# Patient Record
(demographics unavailable — no encounter records)

---

## 2025-06-03 NOTE — HISTORY OF PRESENT ILLNESS
[FreeTextEntry1] : RPA - Wart  [de-identified] : Aramis Bunch 52 y/o M presents for consult for wart on L upper ext.   2 sore thick plaques on L dorsal hand and L anterior elbow for 3 years.  Started out as 2 small bumps.  Thinks it started after getting COVID pfizer vaccine, broke out on L arm same day.  Saw derm 6 months after (3 years ago). Reportedly had bx done and was + for wart.  was given imiquimod, used for 2 weeks, had bad reaction to it , reports it 'blew up arm like ground beef'. was told to stop, no further follow up.  -has been self treating for wart at home. has been applying apple cider vinegar daily x 1 year to skin. skin has been inflamed and sore to touch.  thinks vinegar helping improve pain and swelling.  looks better today than it did before.   No know hx of immune systemic deficiency or issues. No hx HIV. does not have PCP, doesn't typically see doctors.  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 no further details     Personal history of skin cancer: no Family history of skin cancer: no  History of blistering sunburns: no History of tanning bed use: no Uses sunscreen regularly: no

## 2025-06-03 NOTE — ASSESSMENT
[FreeTextEntry1] : #Chronic lichenified plaques  #History of verruca vulgaris  Reports + biopsy 3 years ago from E confirming wart. Suspect that primary process may have started out as verruca 3 years ago, however majority of skin changes today atypical for wart. may be 2/2 chronic contact dermatitis and LSC from inappropriate application of wart treatments such as imiquimod and apple cider vinegar.   ill defined verruocus changes within plaque may represent self inoculation vs un-even treatment of wart lower suspicion for malignancy at this time due to multiple areas of involvement.  Other ddx includes other atypical cutaneous infection, wart   plan: -STOP apple cider vinegar completely  -Only apply gentle emollient like vaseline or aquaphor frequently throughout the day -start clobetasol .05% oint bid to actively inflamed/symptomatic areas -follow up in 8 weeks to monitor response. if insufficient improvement, plan punch bx for H&E and tissue culture.  pt instructed to follow up sooner

## 2025-06-03 NOTE — HISTORY OF PRESENT ILLNESS
[FreeTextEntry1] : RPA - Wart  [de-identified] : Aramis Bunch 54 y/o M presents for consult for wart on L upper ext.   2 sore thick plaques on L dorsal hand and L anterior elbow for 3 years.  Started out as 2 small bumps.  Thinks it started after getting COVID pfizer vaccine, broke out on L arm same day.  Saw derm 6 months after (3 years ago). Reportedly had bx done and was + for wart.  was given imiquimod, used for 2 weeks, had bad reaction to it , reports it 'blew up arm like ground beef'. was told to stop, no further follow up.  -has been self treating for wart at home. has been applying apple cider vinegar daily x 1 year to skin. skin has been inflamed and sore to touch.  thinks vinegar helping improve pain and swelling.  looks better today than it did before.   No know hx of immune systemic deficiency or issues. No hx HIV. does not have PCP, doesn't typically see doctors.  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 no further details     Personal history of skin cancer: no Family history of skin cancer: no  History of blistering sunburns: no History of tanning bed use: no Uses sunscreen regularly: no

## 2025-06-03 NOTE — PHYSICAL EXAM
[FreeTextEntry3] : L dorsal hand, L lat AC fossa: lichenified scaling pink plaques with erosions and crusting. + hyper and hypopigmentation.  ill defined areas of verrucous change and pinpoint hemorrage seen under dermoscopy.